# Patient Record
(demographics unavailable — no encounter records)

---

## 2025-05-11 NOTE — PAST MEDICAL HISTORY
[Total Preg ___] : G[unfilled] [Live Births ___] : P[unfilled]  [Perimenopausal] : The patient is perimenopausal [Menarche Age ____] : age at menarche was [unfilled] [History of Hormone Replacement Treatment] : has a history of hormone replacement treatment [Definite ___ (Date)] : the last menstrual period was [unfilled] [Irregular Cycle Intervals] : are  irregular [Age At Live Birth ___] : Age at live birth: [unfilled] [FreeTextEntry5] : R Lumpectomy 2019 (benign) Breast biopsy [FreeTextEntry6] : No [FreeTextEntry7] : No [FreeTextEntry8] : Yes, 1-1.5 years

## 2025-05-11 NOTE — PHYSICAL EXAM
[Supple] : supple [No Supraclavicular Adenopathy] : no supraclavicular adenopathy [Examined in the supine and seated position] : examined in the supine and seated position [No dominant masses] : no dominant masses in right breast  [No dominant masses] : no dominant masses left breast [No Nipple Retraction] : no left nipple retraction [No Nipple Discharge] : no left nipple discharge [No Axillary Lymphadenopathy] : no left axillary lymphadenopathy [de-identified] : b/l dense breast tissue

## 2025-05-11 NOTE — DATA REVIEWED
[FreeTextEntry1] : 5/7/24 (Outside) B/L sMMG/US: heterogeneously dense. 1.7cm mass L breast.  R 12:00 4cmfn a 0.4cm unchanged hypoechoic mass. BI-RADS 0, incomplete.  6/18/24 (Outside) L MMG + B/L US: resolution of asymmetry in posterior L UOQ breast. F/U: L DX MMG/US in 6 months. BI-RADS 3, probably benign.  2/5/25 (Outside) L DX MMG + B/L US: heterogeneously dense. B/L cysts on US. CICI. BI-RADS 2, benign. B/L MMG/US in May 2025.

## 2025-05-11 NOTE — ASSESSMENT
[FreeTextEntry1] : 50 year old female presents for initial evaluation regarding elevated   and CA 27.29 results and previous abnormal breast imaging. Most recently, f/u left breast mammogram and bilateral US (2/5/25), BI-RADS 2 and rec resuming annual mammo/sono on schedule (due May 2025).   Discussed with patient that she should be seen by a medical oncologist for the concern of elevated tumor markers. Patient provided with referral to med onc Dr. Monahan. Scripts placed for repeat tumor marker analysis. Patient to retrn after labs and imaging and, if needed, will refer to med onc.  GUANAKITO 8.6%. Patient without complaints. Physical exam WNL. NCCN distress management tool filled out by patient, not elevated. Most recent imaging reviewed. Advised patient that the recent follow up imaging indicates benignity of the findings in bilateral breasts and that she is overdue for her recommended imaging. Plan for B/L sMMG/US now and, if benign, return for re-examination in 3 months. Patient verbalizes understanding and is in agreement with the plan.

## 2025-05-11 NOTE — HISTORY OF PRESENT ILLNESS
[FreeTextEntry1] : 50 year old female was referred by Dr. Jacobson who presents for initial evaluation regarding elevated  and CA 27.29 results and previous abnormal breast imaging. Most recently, f/u left breast mammogram and bilateral US (2/5/25), BI-RADS 2 and rec resuming annual mammo/sono on schedule (due May 2025). Patient denies palpable masses, nipple discharge, skin changes.  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  Denies prior breast surgeries. Patient denies family history of breast cancer. Denies famhx of ovarian cancer. Patient has not had genetic testing performed.   Karen Lifetime Risk 8.6%

## 2025-05-11 NOTE — CONSULT LETTER
[Dear  ___] : Dear ~MARCE, [Consult Letter:] : I had the pleasure of evaluating your patient, [unfilled]. [Please see my note below.] : Please see my note below. [Consult Closing:] : Thank you very much for allowing me to participate in the care of this patient.  If you have any questions, please do not hesitate to contact me. [Sincerely,] : Sincerely, [FreeTextEntry2] : Dr. Jacobson [FreeTextEntry3] : Erick Gan MD Chief of Breast Surgery Director of Breast Cancer Program [facility]

## 2025-06-02 NOTE — PHYSICAL EXAM
[Supple] : supple [No Supraclavicular Adenopathy] : no supraclavicular adenopathy [Examined in the supine and seated position] : examined in the supine and seated position [No dominant masses] : no dominant masses in right breast  [No dominant masses] : no dominant masses left breast [No Nipple Retraction] : no left nipple retraction [No Nipple Discharge] : no left nipple discharge [No Axillary Lymphadenopathy] : no left axillary lymphadenopathy [de-identified] : b/l dense breast tissue

## 2025-06-02 NOTE — HISTORY OF PRESENT ILLNESS
[FreeTextEntry1] : 50 year old female, patient of Dr. Jacobson, presents for follow up evaluation regarding elevated  and CA 27.29 results. Repeat cancer antigen testing was completed ???? 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 Patient completed her annual B/L sMMG/US on 5/14/25, BI-RADS 2. Patient denies palpable masses, nipple discharge, skin changes.  Denies prior breast surgeries. Patient denies family history of breast cancer. Denies famhx of ovarian cancer. Patient has not had genetic testing performed.   Karen Lifetime Risk 8.6%

## 2025-06-02 NOTE — PAST MEDICAL HISTORY
[Perimenopausal] : The patient is perimenopausal [Menarche Age ____] : age at menarche was [unfilled] [History of Hormone Replacement Treatment] : has a history of hormone replacement treatment [Definite ___ (Date)] : the last menstrual period was [unfilled] [Irregular Cycle Intervals] : are  irregular [Total Preg ___] : G[unfilled] [Live Births ___] : P[unfilled]  [Age At Live Birth ___] : Age at live birth: [unfilled] [FreeTextEntry5] : R Lumpectomy 2019 (benign) Breast biopsy [FreeTextEntry6] : No [FreeTextEntry7] : No [FreeTextEntry8] : Yes, 1-1.5 years

## 2025-06-02 NOTE — ASSESSMENT
[FreeTextEntry1] : 50 year old female presents for follow up evaluation regarding elevated  and CA 27.29 results.  ***** Repeat cancer antigen testing revealed ?????  **CF: Discussed with patient that she should be seen by a medical oncologist for the concern of elevated tumor markers. Patient provided with referral to med onc Dr. Monahan. Scripts placed for repeat tumor marker analysis. Patient to return after labs and imaging and, if needed, will refer to med onc.  GUANAKITO 8.6%. Patient without complaints. Physical exam WNL. NCCN distress management tool filled out by patient, not elevated. Most recent imaging reviewed. Discussed patients labs in depth and answered all of her questions thoroughly. Plan for B/L sMMG/US and re-examination in 1 year. MED ONC ??? Patient verbalizes understanding and is in agreement with the plan.

## 2025-06-02 NOTE — DATA REVIEWED
[FreeTextEntry1] : 5/7/24 (Outside) B/L sMMG/US: heterogeneously dense. 1.7cm mass L breast.  R 12:00 4cmfn a 0.4cm unchanged hypoechoic mass. BI-RADS 0, incomplete.  6/18/24 (Outside) L MMG + B/L US: resolution of asymmetry in posterior L UOQ breast. F/U: L DX MMG/US in 6 months. BI-RADS 3, probably benign.  2/5/25 (Outside) L DX MMG + B/L US: heterogeneously dense. B/L cysts on US. CICI. BI-RADS 2, benign. B/L MMG/US in May 2025.  5/14/25 ([place]) B/L sMMG/US: scattered areas of fibroglandular density. B/L breast cysts and cluster of cysts. Follow Up: Mammography and ultrasound in 1 year. BI-RADS 2 -Benign

## 2025-06-12 NOTE — PAST MEDICAL HISTORY
[FreeTextEntry5] : R Lumpectomy 2019 (benign) Breast biopsy [FreeTextEntry6] : No [FreeTextEntry7] : No [FreeTextEntry8] : Yes, 1-1.5 years